# Patient Record
Sex: FEMALE | Race: WHITE | NOT HISPANIC OR LATINO | Employment: OTHER | URBAN - METROPOLITAN AREA
[De-identification: names, ages, dates, MRNs, and addresses within clinical notes are randomized per-mention and may not be internally consistent; named-entity substitution may affect disease eponyms.]

---

## 2017-09-26 ENCOUNTER — OFFICE VISIT (OUTPATIENT)
Dept: OBSTETRICS AND GYNECOLOGY | Facility: CLINIC | Age: 27
End: 2017-09-26

## 2017-09-26 VITALS
WEIGHT: 250 LBS | BODY MASS INDEX: 42.68 KG/M2 | SYSTOLIC BLOOD PRESSURE: 118 MMHG | DIASTOLIC BLOOD PRESSURE: 60 MMHG | HEIGHT: 64 IN

## 2017-09-26 DIAGNOSIS — N93.9 ABNORMAL UTERINE BLEEDING (AUB): Primary | ICD-10-CM

## 2017-09-26 DIAGNOSIS — R10.2 PELVIC PAIN IN FEMALE: ICD-10-CM

## 2017-09-26 PROCEDURE — 99203 OFFICE O/P NEW LOW 30 MIN: CPT | Performed by: OBSTETRICS & GYNECOLOGY

## 2017-09-26 RX ORDER — ALBUTEROL SULFATE 90 UG/1
2 AEROSOL, METERED RESPIRATORY (INHALATION)
COMMUNITY
Start: 2017-05-17

## 2017-09-26 RX ORDER — CLONAZEPAM 1 MG/1
1 TABLET ORAL
COMMUNITY

## 2017-09-26 RX ORDER — HYDROCODONE BITARTRATE AND ACETAMINOPHEN 10; 300 MG/1; MG/1
TABLET ORAL
COMMUNITY

## 2017-09-26 RX ORDER — DICYCLOMINE HYDROCHLORIDE 10 MG/1
10 CAPSULE ORAL
COMMUNITY
Start: 2017-05-31 | End: 2018-05-31

## 2017-09-26 RX ORDER — OXYCODONE AND ACETAMINOPHEN 7.5; 325 MG/1; MG/1
0.5 TABLET ORAL
COMMUNITY

## 2017-09-26 RX ORDER — ESCITALOPRAM OXALATE 20 MG/1
20 TABLET ORAL
COMMUNITY
Start: 2015-06-17

## 2017-09-26 RX ORDER — OMEPRAZOLE 40 MG/1
CAPSULE, DELAYED RELEASE ORAL
COMMUNITY
Start: 2017-09-13 | End: 2022-09-16

## 2017-09-26 RX ORDER — HYDROXYZINE HYDROCHLORIDE 25 MG/1
25 TABLET, FILM COATED ORAL
COMMUNITY

## 2017-09-26 RX ORDER — PROMETHAZINE HYDROCHLORIDE 25 MG/1
25 TABLET ORAL
COMMUNITY

## 2017-09-26 RX ORDER — TIZANIDINE HYDROCHLORIDE 4 MG/1
4 CAPSULE, GELATIN COATED ORAL
COMMUNITY

## 2017-09-26 RX ORDER — NORGESTIMATE AND ETHINYL ESTRADIOL 0.25-0.035
KIT ORAL
COMMUNITY

## 2017-09-26 RX ORDER — IPRATROPIUM BROMIDE AND ALBUTEROL SULFATE 2.5; .5 MG/3ML; MG/3ML
3 SOLUTION RESPIRATORY (INHALATION)
COMMUNITY
Start: 2017-05-17

## 2017-09-26 RX ORDER — GABAPENTIN 400 MG/1
1200 CAPSULE ORAL
COMMUNITY

## 2017-09-26 NOTE — PROGRESS NOTES
Subjective   Rosalie Elena is a 27 y.o. female is here today as a self referral for severe dysmenorrhea and heavy bleeding..    History of Present Illness-patient referred by Suzi Lockwood her nurse practitioner for a long history of abnormal.  Both bleeding and pelvic pain.  She had a laparotomy in the past which did not reveal any endometriosis.  She is being seen by Dr. Macy Nuno for interstitial cystitis and bladder ulcerations.  She has tried Mirena as well as multiple oral contraceptives without a resolution of her symptoms.  She and her mother both desirous of proceeding with a uterine ablation in hopes of helping both the bleeding and cramping with her menstrual cycles.  This was discussed in detail including the expectations, technique, recovery and risks.    The following portions of the patient's history were reviewed and updated as appropriate: allergies, current medications, past family history, past medical history, past social history, past surgical history and problem list.   Allergies   Allergen Reactions   • Aripiprazole    • Baclofen    • Cholestatin    • Eggs Or Egg-Derived Products    • Gluten Meal    • Ketorolac Tromethamine      MAKES ANXIOUS   • Lanolin    • Methylprednisolone      MAKES MANIC   • Moxifloxacin    • Neomycin    • Other      ANTI-PSYCHOTICS   • Pentosan Polysulfate Sodium    • Quetiapine    • Uretron D-S      Past Medical History:   Diagnosis Date   • Anxiety    • Asthma    • Bipolar disorder    • Colitis    • Fibromyalgia    • Hypermobile joints    • IBS (irritable bowel syndrome)    • Migraine    • PMDD (premenstrual dysphoric disorder)    • PTSD (post-traumatic stress disorder)      Past Surgical History:   Procedure Laterality Date   • EXPLORATORY LAPAROTOMY     • WISDOM TOOTH EXTRACTION       No current outpatient prescriptions on file prior to visit.     No current facility-administered medications on file prior to visit.      History reviewed. No pertinent  family history.      Review of Systems   Constitutional: Negative.    HENT: Negative.    Eyes: Negative.    Respiratory: Negative.    Cardiovascular: Negative.    Gastrointestinal: Negative.    Endocrine: Negative.    Genitourinary: Positive for menstrual problem, pelvic pain and vaginal bleeding.   Musculoskeletal: Negative.    Skin: Negative.    Allergic/Immunologic: Negative.    Neurological: Negative.    Hematological: Negative.    Psychiatric/Behavioral: Negative.        Objective   Physical Exam   Constitutional: She is oriented to person, place, and time. She appears well-developed and well-nourished.   HENT:   Head: Normocephalic and atraumatic.   Eyes: Pupils are equal, round, and reactive to light.   Pulmonary/Chest: Effort normal.   Neurological: She is alert and oriented to person, place, and time. She has normal reflexes.   Psychiatric: She has a normal mood and affect. Her behavior is normal. Judgment and thought content normal.   Nursing note and vitals reviewed.        Assessment/Plan   Problems Addressed this Visit     None      Visit Diagnoses     Abnormal uterine bleeding (AUB)    -  Primary    Relevant Orders    Case Request    Pelvic pain in female        Relevant Orders    Case Request      After long discussion of potential benefits and risks with both patient and her mother she would like to proceed with a uterine ablation.  She is aware of the scarring of the uterus that will result and the recommendation of not trying to get pregnant after the procedure.  She is also aware because of her young age she is in a risk category of the likely having a recurrence of symptoms later on down the road.

## 2018-09-12 ENCOUNTER — OFFICE (OUTPATIENT)
Dept: URBAN - METROPOLITAN AREA CLINIC 75 | Facility: CLINIC | Age: 28
End: 2018-09-12
Payer: MEDICAID

## 2018-09-12 VITALS
SYSTOLIC BLOOD PRESSURE: 122 MMHG | HEIGHT: 64 IN | WEIGHT: 232 LBS | DIASTOLIC BLOOD PRESSURE: 66 MMHG | HEART RATE: 88 BPM

## 2018-09-12 DIAGNOSIS — R19.7 DIARRHEA, UNSPECIFIED: ICD-10-CM

## 2018-09-12 DIAGNOSIS — K59.00 CONSTIPATION, UNSPECIFIED: ICD-10-CM

## 2018-09-12 DIAGNOSIS — K21.9 GASTRO-ESOPHAGEAL REFLUX DISEASE WITHOUT ESOPHAGITIS: ICD-10-CM

## 2018-09-12 DIAGNOSIS — K62.5 HEMORRHAGE OF ANUS AND RECTUM: ICD-10-CM

## 2018-09-12 DIAGNOSIS — Z83.71 FAMILY HISTORY OF COLONIC POLYPS: ICD-10-CM

## 2018-09-12 DIAGNOSIS — R11.0 NAUSEA: ICD-10-CM

## 2018-09-12 PROCEDURE — 99204 OFFICE O/P NEW MOD 45 MIN: CPT | Performed by: INTERNAL MEDICINE

## 2018-09-12 RX ORDER — DEXLANSOPRAZOLE 60 MG/1
CAPSULE, DELAYED RELEASE ORAL
Qty: 30 | Refills: 11 | Status: COMPLETED
Start: 2018-09-12 | End: 2019-02-07

## 2019-02-07 VITALS
HEART RATE: 60 BPM | DIASTOLIC BLOOD PRESSURE: 65 MMHG | OXYGEN SATURATION: 98 % | SYSTOLIC BLOOD PRESSURE: 111 MMHG | SYSTOLIC BLOOD PRESSURE: 129 MMHG | RESPIRATION RATE: 12 BRPM | OXYGEN SATURATION: 96 % | SYSTOLIC BLOOD PRESSURE: 93 MMHG | HEART RATE: 67 BPM | TEMPERATURE: 97.8 F | HEART RATE: 70 BPM | RESPIRATION RATE: 18 BRPM | DIASTOLIC BLOOD PRESSURE: 46 MMHG | OXYGEN SATURATION: 95 % | DIASTOLIC BLOOD PRESSURE: 56 MMHG | DIASTOLIC BLOOD PRESSURE: 42 MMHG | OXYGEN SATURATION: 97 % | RESPIRATION RATE: 16 BRPM | WEIGHT: 190 LBS | SYSTOLIC BLOOD PRESSURE: 112 MMHG | HEART RATE: 63 BPM | SYSTOLIC BLOOD PRESSURE: 99 MMHG | OXYGEN SATURATION: 99 % | DIASTOLIC BLOOD PRESSURE: 58 MMHG | RESPIRATION RATE: 19 BRPM | SYSTOLIC BLOOD PRESSURE: 90 MMHG | HEART RATE: 69 BPM | HEART RATE: 68 BPM | HEART RATE: 65 BPM | RESPIRATION RATE: 17 BRPM | HEIGHT: 64 IN | DIASTOLIC BLOOD PRESSURE: 77 MMHG | DIASTOLIC BLOOD PRESSURE: 69 MMHG | SYSTOLIC BLOOD PRESSURE: 98 MMHG | DIASTOLIC BLOOD PRESSURE: 57 MMHG | HEART RATE: 62 BPM | RESPIRATION RATE: 14 BRPM | TEMPERATURE: 99.2 F

## 2019-02-08 ENCOUNTER — AMBULATORY SURGICAL CENTER (OUTPATIENT)
Dept: URBAN - METROPOLITAN AREA SURGERY 17 | Facility: SURGERY | Age: 29
End: 2019-02-08
Payer: MEDICAID

## 2019-02-08 ENCOUNTER — OFFICE (OUTPATIENT)
Dept: URBAN - METROPOLITAN AREA PATHOLOGY 4 | Facility: PATHOLOGY | Age: 29
End: 2019-02-08
Payer: MEDICAID

## 2019-02-08 DIAGNOSIS — R19.7 DIARRHEA, UNSPECIFIED: ICD-10-CM

## 2019-02-08 DIAGNOSIS — R11.0 NAUSEA: ICD-10-CM

## 2019-02-08 DIAGNOSIS — K62.5 HEMORRHAGE OF ANUS AND RECTUM: ICD-10-CM

## 2019-02-08 DIAGNOSIS — K29.70 GASTRITIS, UNSPECIFIED, WITHOUT BLEEDING: ICD-10-CM

## 2019-02-08 DIAGNOSIS — K31.89 OTHER DISEASES OF STOMACH AND DUODENUM: ICD-10-CM

## 2019-02-08 DIAGNOSIS — Z83.71 FAMILY HISTORY OF COLONIC POLYPS: ICD-10-CM

## 2019-02-08 DIAGNOSIS — K21.9 GASTRO-ESOPHAGEAL REFLUX DISEASE WITHOUT ESOPHAGITIS: ICD-10-CM

## 2019-02-08 LAB
GI HISTOLOGY: A. UNSPECIFIED: (no result)
GI HISTOLOGY: B. SELECT: (no result)
GI HISTOLOGY: C. UNSPECIFIED: (no result)
GI HISTOLOGY: D. UNSPECIFIED: (no result)
GI HISTOLOGY: PDF REPORT: (no result)

## 2019-02-08 PROCEDURE — 45380 COLONOSCOPY AND BIOPSY: CPT | Performed by: INTERNAL MEDICINE

## 2019-02-08 PROCEDURE — 88305 TISSUE EXAM BY PATHOLOGIST: CPT | Performed by: INTERNAL MEDICINE

## 2019-02-08 PROCEDURE — 43239 EGD BIOPSY SINGLE/MULTIPLE: CPT | Performed by: INTERNAL MEDICINE

## 2024-06-14 ENCOUNTER — TELEPHONE (OUTPATIENT)
Dept: GASTROENTEROLOGY | Facility: CLINIC | Age: 34
End: 2024-06-14
Payer: MEDICARE

## 2024-06-14 NOTE — TELEPHONE ENCOUNTER
Left voice message for patient to return call in reference to a referral we received from Caryn Pedersen for Gastro-esophageal reflux disease without esophagitis. Patient needs a DA phone screening appointment. Patient can be schedule next available with any office. She isn't established with any Gastro office. Will defer for two days to give patient time to return call.

## 2024-06-17 NOTE — TELEPHONE ENCOUNTER
Patient's mother Lakesha, called to make an appointment for her daughter. I advised that the office received a notice from PCP about GERD. Mother stated that her daughter has GI issues. Bloating, food allergies, GERD, rectum gets tight to the point that patient has to open it herself.  So with these other details could not scheduled patient for a DA call had to scheduled for a new patient appointment.  Mom wanted to make sure that the providers were female advised that the provider and APRN's were female. Scheduled for first appointment with the latest arrival time. Reminder has been mailed to this patient.

## 2024-07-26 ENCOUNTER — HOSPITAL ENCOUNTER (EMERGENCY)
Facility: HOSPITAL | Age: 34
Discharge: PSYCHIATRIC OR SUBSTANCE USE FACILITY | DRG: 885 | End: 2024-07-27
Attending: EMERGENCY MEDICINE
Payer: MEDICARE

## 2024-07-26 DIAGNOSIS — R45.851 SUICIDAL IDEATION: Primary | ICD-10-CM

## 2024-07-26 PROCEDURE — 99285 EMERGENCY DEPT VISIT HI MDM: CPT

## 2024-07-26 PROCEDURE — 36415 COLL VENOUS BLD VENIPUNCTURE: CPT

## 2024-07-27 ENCOUNTER — HOSPITAL ENCOUNTER (INPATIENT)
Facility: HOSPITAL | Age: 34
LOS: 2 days | Discharge: HOME | End: 2024-07-29
Attending: PSYCHIATRY & NEUROLOGY | Admitting: PSYCHIATRY & NEUROLOGY
Payer: MEDICARE

## 2024-07-27 VITALS
OXYGEN SATURATION: 100 % | HEIGHT: 64 IN | SYSTOLIC BLOOD PRESSURE: 104 MMHG | RESPIRATION RATE: 18 BRPM | TEMPERATURE: 98.3 F | BODY MASS INDEX: 22.81 KG/M2 | WEIGHT: 133.6 LBS | DIASTOLIC BLOOD PRESSURE: 74 MMHG | HEART RATE: 68 BPM

## 2024-07-27 PROBLEM — F32.A DEPRESSION, UNSPECIFIED: Status: ACTIVE | Noted: 2024-07-27

## 2024-07-27 LAB
ALBUMIN SERPL-MCNC: 4.2 G/DL (ref 3.5–5.2)
ALBUMIN/GLOB SERPL: 2 G/DL
ALP SERPL-CCNC: 60 U/L (ref 39–117)
ALT SERPL W P-5'-P-CCNC: 15 U/L (ref 1–33)
AMPHET+METHAMPHET UR QL: NEGATIVE
ANION GAP SERPL CALCULATED.3IONS-SCNC: 11.9 MMOL/L (ref 5–15)
APAP SERPL-MCNC: <5 MCG/ML (ref 0–30)
AST SERPL-CCNC: 19 U/L (ref 1–32)
BARBITURATES UR QL SCN: NEGATIVE
BASOPHILS # BLD AUTO: 0.04 10*3/MM3 (ref 0–0.2)
BASOPHILS NFR BLD AUTO: 0.5 % (ref 0–1.5)
BENZODIAZ UR QL SCN: POSITIVE
BILIRUB SERPL-MCNC: 0.3 MG/DL (ref 0–1.2)
BUN SERPL-MCNC: 11 MG/DL (ref 6–20)
BUN/CREAT SERPL: 13.6 (ref 7–25)
CALCIUM SPEC-SCNC: 9.2 MG/DL (ref 8.6–10.5)
CANNABINOIDS SERPL QL: POSITIVE
CHLORIDE SERPL-SCNC: 105 MMOL/L (ref 98–107)
CO2 SERPL-SCNC: 24.1 MMOL/L (ref 22–29)
COCAINE UR QL: NEGATIVE
CREAT SERPL-MCNC: 0.81 MG/DL (ref 0.57–1)
DEPRECATED RDW RBC AUTO: 42.7 FL (ref 37–54)
EGFRCR SERPLBLD CKD-EPI 2021: 98.4 ML/MIN/1.73
EOSINOPHIL # BLD AUTO: 0.37 10*3/MM3 (ref 0–0.4)
EOSINOPHIL NFR BLD AUTO: 4.2 % (ref 0.3–6.2)
ERYTHROCYTE [DISTWIDTH] IN BLOOD BY AUTOMATED COUNT: 12.7 % (ref 12.3–15.4)
ETHANOL BLD-MCNC: <10 MG/DL (ref 0–10)
ETHANOL UR QL: <0.01 %
FENTANYL UR-MCNC: POSITIVE NG/ML
GLOBULIN UR ELPH-MCNC: 2.1 GM/DL
GLUCOSE SERPL-MCNC: 97 MG/DL (ref 65–99)
HCG INTACT+B SERPL-ACNC: <0.5 MIU/ML
HCT VFR BLD AUTO: 36.1 % (ref 34–46.6)
HGB BLD-MCNC: 12.3 G/DL (ref 12–15.9)
HOLD SPECIMEN: NORMAL
HOLD SPECIMEN: NORMAL
IMM GRANULOCYTES # BLD AUTO: 0.02 10*3/MM3 (ref 0–0.05)
IMM GRANULOCYTES NFR BLD AUTO: 0.2 % (ref 0–0.5)
LYMPHOCYTES # BLD AUTO: 2.34 10*3/MM3 (ref 0.7–3.1)
LYMPHOCYTES NFR BLD AUTO: 26.5 % (ref 19.6–45.3)
MCH RBC QN AUTO: 31.5 PG (ref 26.6–33)
MCHC RBC AUTO-ENTMCNC: 34.1 G/DL (ref 31.5–35.7)
MCV RBC AUTO: 92.3 FL (ref 79–97)
METHADONE UR QL SCN: NEGATIVE
MONOCYTES # BLD AUTO: 0.48 10*3/MM3 (ref 0.1–0.9)
MONOCYTES NFR BLD AUTO: 5.4 % (ref 5–12)
NEUTROPHILS NFR BLD AUTO: 5.58 10*3/MM3 (ref 1.7–7)
NEUTROPHILS NFR BLD AUTO: 63.2 % (ref 42.7–76)
NRBC BLD AUTO-RTO: 0 /100 WBC (ref 0–0.2)
OPIATES UR QL: NEGATIVE
OXYCODONE UR QL SCN: NEGATIVE
PLATELET # BLD AUTO: 185 10*3/MM3 (ref 140–450)
PMV BLD AUTO: 11.8 FL (ref 6–12)
POTASSIUM SERPL-SCNC: 3.9 MMOL/L (ref 3.5–5.2)
PROT SERPL-MCNC: 6.3 G/DL (ref 6–8.5)
QT INTERVAL: 409 MS
QTC INTERVAL: 425 MS
RBC # BLD AUTO: 3.91 10*6/MM3 (ref 3.77–5.28)
SALICYLATES SERPL-MCNC: <0.3 MG/DL
SODIUM SERPL-SCNC: 141 MMOL/L (ref 136–145)
T4 FREE SERPL-MCNC: 1.12 NG/DL (ref 0.92–1.68)
TSH SERPL DL<=0.05 MIU/L-ACNC: 1.33 UIU/ML (ref 0.27–4.2)
WBC NRBC COR # BLD AUTO: 8.83 10*3/MM3 (ref 3.4–10.8)
WHOLE BLOOD HOLD COAG: NORMAL
WHOLE BLOOD HOLD SPECIMEN: NORMAL

## 2024-07-27 PROCEDURE — 93005 ELECTROCARDIOGRAM TRACING: CPT | Performed by: EMERGENCY MEDICINE

## 2024-07-27 PROCEDURE — 80307 DRUG TEST PRSMV CHEM ANLYZR: CPT | Performed by: EMERGENCY MEDICINE

## 2024-07-27 PROCEDURE — 84702 CHORIONIC GONADOTROPIN TEST: CPT | Performed by: EMERGENCY MEDICINE

## 2024-07-27 PROCEDURE — 84439 ASSAY OF FREE THYROXINE: CPT | Performed by: EMERGENCY MEDICINE

## 2024-07-27 PROCEDURE — 36415 COLL VENOUS BLD VENIPUNCTURE: CPT

## 2024-07-27 PROCEDURE — 82077 ASSAY SPEC XCP UR&BREATH IA: CPT | Performed by: EMERGENCY MEDICINE

## 2024-07-27 PROCEDURE — 63710000001 ONDANSETRON ODT 4 MG TABLET DISPERSIBLE: Performed by: PSYCHIATRY & NEUROLOGY

## 2024-07-27 PROCEDURE — 80143 DRUG ASSAY ACETAMINOPHEN: CPT | Performed by: EMERGENCY MEDICINE

## 2024-07-27 PROCEDURE — 80053 COMPREHEN METABOLIC PANEL: CPT | Performed by: EMERGENCY MEDICINE

## 2024-07-27 PROCEDURE — 84443 ASSAY THYROID STIM HORMONE: CPT | Performed by: EMERGENCY MEDICINE

## 2024-07-27 PROCEDURE — 85025 COMPLETE CBC W/AUTO DIFF WBC: CPT | Performed by: EMERGENCY MEDICINE

## 2024-07-27 PROCEDURE — 80179 DRUG ASSAY SALICYLATE: CPT | Performed by: EMERGENCY MEDICINE

## 2024-07-27 RX ORDER — ALUMINA, MAGNESIA, AND SIMETHICONE 2400; 2400; 240 MG/30ML; MG/30ML; MG/30ML
15 SUSPENSION ORAL EVERY 6 HOURS PRN
Status: DISCONTINUED | OUTPATIENT
Start: 2024-07-27 | End: 2024-07-29 | Stop reason: HOSPADM

## 2024-07-27 RX ORDER — ACETAMINOPHEN 500 MG
1000 TABLET ORAL ONCE
Status: COMPLETED | OUTPATIENT
Start: 2024-07-27 | End: 2024-07-27

## 2024-07-27 RX ORDER — DIPHENHYDRAMINE HCL 50 MG
50 CAPSULE ORAL EVERY 4 HOURS PRN
Status: DISCONTINUED | OUTPATIENT
Start: 2024-07-27 | End: 2024-07-29 | Stop reason: HOSPADM

## 2024-07-27 RX ORDER — ACETAMINOPHEN 325 MG/1
650 TABLET ORAL EVERY 6 HOURS PRN
Status: DISCONTINUED | OUTPATIENT
Start: 2024-07-27 | End: 2024-07-29 | Stop reason: HOSPADM

## 2024-07-27 RX ORDER — LORAZEPAM 2 MG/ML
2 INJECTION INTRAMUSCULAR
Status: DISCONTINUED | OUTPATIENT
Start: 2024-07-27 | End: 2024-07-27

## 2024-07-27 RX ORDER — FOLIC ACID 1 MG/1
1 TABLET ORAL DAILY
Status: DISCONTINUED | OUTPATIENT
Start: 2024-07-27 | End: 2024-07-29 | Stop reason: HOSPADM

## 2024-07-27 RX ORDER — GABAPENTIN 600 MG/1
600 TABLET ORAL EVERY MORNING
COMMUNITY

## 2024-07-27 RX ORDER — GABAPENTIN 600 MG/1
1200 TABLET ORAL EVERY EVENING
COMMUNITY

## 2024-07-27 RX ORDER — TRAZODONE HYDROCHLORIDE 100 MG/1
100 TABLET ORAL NIGHTLY PRN
Status: DISCONTINUED | OUTPATIENT
Start: 2024-07-27 | End: 2024-07-29 | Stop reason: HOSPADM

## 2024-07-27 RX ORDER — ESCITALOPRAM OXALATE 10 MG/1
20 TABLET ORAL DAILY
Status: DISCONTINUED | OUTPATIENT
Start: 2024-07-27 | End: 2024-07-29 | Stop reason: HOSPADM

## 2024-07-27 RX ORDER — HYDROXYZINE HYDROCHLORIDE 25 MG/1
50 TABLET, FILM COATED ORAL EVERY 6 HOURS PRN
Status: DISCONTINUED | OUTPATIENT
Start: 2024-07-27 | End: 2024-07-29 | Stop reason: HOSPADM

## 2024-07-27 RX ORDER — FAMOTIDINE 20 MG/1
20 TABLET, FILM COATED ORAL DAILY
Status: DISCONTINUED | OUTPATIENT
Start: 2024-07-27 | End: 2024-07-29

## 2024-07-27 RX ORDER — TIZANIDINE 4 MG/1
4 TABLET ORAL ONCE
Status: COMPLETED | OUTPATIENT
Start: 2024-07-27 | End: 2024-07-27

## 2024-07-27 RX ORDER — DIPHENHYDRAMINE HYDROCHLORIDE 50 MG/ML
50 INJECTION INTRAMUSCULAR; INTRAVENOUS EVERY 4 HOURS PRN
Status: DISCONTINUED | OUTPATIENT
Start: 2024-07-27 | End: 2024-07-29 | Stop reason: HOSPADM

## 2024-07-27 RX ORDER — ONDANSETRON 8 MG/1
8 TABLET, ORALLY DISINTEGRATING ORAL EVERY 8 HOURS PRN
COMMUNITY

## 2024-07-27 RX ORDER — GABAPENTIN 400 MG/1
1200 CAPSULE ORAL NIGHTLY
Status: DISCONTINUED | OUTPATIENT
Start: 2024-07-27 | End: 2024-07-29 | Stop reason: HOSPADM

## 2024-07-27 RX ORDER — ONDANSETRON 4 MG/1
4 TABLET, ORALLY DISINTEGRATING ORAL EVERY 6 HOURS PRN
Status: DISCONTINUED | OUTPATIENT
Start: 2024-07-27 | End: 2024-07-29 | Stop reason: HOSPADM

## 2024-07-27 RX ORDER — CLONAZEPAM 0.5 MG/1
1 TABLET ORAL 2 TIMES DAILY
Status: DISCONTINUED | OUTPATIENT
Start: 2024-07-27 | End: 2024-07-29 | Stop reason: HOSPADM

## 2024-07-27 RX ORDER — LORAZEPAM 2 MG/ML
1 INJECTION INTRAMUSCULAR
Status: DISCONTINUED | OUTPATIENT
Start: 2024-07-27 | End: 2024-07-27

## 2024-07-27 RX ORDER — LOPERAMIDE HYDROCHLORIDE 2 MG/1
2 CAPSULE ORAL
Status: DISCONTINUED | OUTPATIENT
Start: 2024-07-27 | End: 2024-07-29 | Stop reason: HOSPADM

## 2024-07-27 RX ORDER — CLONAZEPAM 1 MG/1
2 TABLET ORAL NIGHTLY PRN
COMMUNITY

## 2024-07-27 RX ORDER — LORAZEPAM 2 MG/1
2 TABLET ORAL
Status: DISCONTINUED | OUTPATIENT
Start: 2024-07-27 | End: 2024-07-27

## 2024-07-27 RX ORDER — NICOTINE 21 MG/24HR
1 PATCH, TRANSDERMAL 24 HOURS TRANSDERMAL DAILY PRN
Status: DISCONTINUED | OUTPATIENT
Start: 2024-07-27 | End: 2024-07-29 | Stop reason: HOSPADM

## 2024-07-27 RX ORDER — ALBUTEROL SULFATE 90 UG/1
2 AEROSOL, METERED RESPIRATORY (INHALATION) EVERY 4 HOURS PRN
Status: DISCONTINUED | OUTPATIENT
Start: 2024-07-27 | End: 2024-07-29 | Stop reason: HOSPADM

## 2024-07-27 RX ORDER — LORAZEPAM 0.5 MG/1
1 TABLET ORAL
Status: DISCONTINUED | OUTPATIENT
Start: 2024-07-27 | End: 2024-07-27

## 2024-07-27 RX ORDER — POLYETHYLENE GLYCOL 3350 17 G/17G
17 POWDER, FOR SOLUTION ORAL DAILY PRN
Status: DISCONTINUED | OUTPATIENT
Start: 2024-07-27 | End: 2024-07-29 | Stop reason: HOSPADM

## 2024-07-27 RX ORDER — HYOSCYAMINE SULFATE 0.38 MG/1
375 TABLET, EXTENDED RELEASE ORAL EVERY 12 HOURS
Status: DISCONTINUED | OUTPATIENT
Start: 2024-07-27 | End: 2024-07-29 | Stop reason: HOSPADM

## 2024-07-27 RX ORDER — GABAPENTIN 300 MG/1
600 CAPSULE ORAL EVERY MORNING
Status: DISCONTINUED | OUTPATIENT
Start: 2024-07-27 | End: 2024-07-28

## 2024-07-27 RX ADMIN — ONDANSETRON 4 MG: 4 TABLET, ORALLY DISINTEGRATING ORAL at 18:12

## 2024-07-27 RX ADMIN — FOLIC ACID 1 MG: 1 TABLET ORAL at 10:01

## 2024-07-27 RX ADMIN — TIZANIDINE 4 MG: 4 TABLET ORAL at 21:07

## 2024-07-27 RX ADMIN — ACETAMINOPHEN 1000 MG: 500 TABLET ORAL at 03:22

## 2024-07-27 RX ADMIN — CLONAZEPAM 1 MG: 0.5 TABLET ORAL at 21:06

## 2024-07-27 RX ADMIN — GABAPENTIN 1200 MG: 400 CAPSULE ORAL at 21:07

## 2024-07-27 RX ADMIN — ESCITALOPRAM OXALATE 20 MG: 10 TABLET ORAL at 10:01

## 2024-07-27 RX ADMIN — FAMOTIDINE 20 MG: 20 TABLET ORAL at 10:01

## 2024-07-27 RX ADMIN — GABAPENTIN 600 MG: 300 CAPSULE ORAL at 11:01

## 2024-07-27 RX ADMIN — HYOSCYAMINE SULFATE 375 MCG: 0.38 TABLET, EXTENDED RELEASE ORAL at 11:01

## 2024-07-27 RX ADMIN — HYOSCYAMINE SULFATE 375 MCG: 0.38 TABLET, EXTENDED RELEASE ORAL at 21:07

## 2024-07-27 RX ADMIN — CLONAZEPAM 1 MG: 0.5 TABLET ORAL at 10:01

## 2024-07-27 NOTE — PLAN OF CARE
Goal Outcome Evaluation:  Plan of Care Reviewed With: patient  Patient Agreement with Plan of Care: agrees      Pt. Has been cooperative,  reports feeling tired and waiting for the doctor to restart her medications and for dietary to get her diet right. Pt. Reports food allergy to gluten,soy,dairy and rice but denied any to eggs. Pt. Also stated she can eat meat. Pt. Is denying any si/hi/avh and making her needs known. Pt. This afternoon attended group and her mood is better this afternoon. Pt. Remains on CW 1:1 for safety, will con't to monitor and provide a safe environment. Reviewed with charge nurse.
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
## 2024-07-27 NOTE — ED NOTES
Pt Sitting up in bed eating popsicle and drinking water. Briana well at this time. Pt also ate and briana banana brought by mother. Pt offered multiple other choices with declination, stating that she has gluten and dairy allergies and also cannot drink juices and soda due to hx of cystitis.
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
## 2024-07-27 NOTE — ED NOTES
Report given to Jerica MURRAY. Pt instructed on room assignment, need to sign consent form, and that she could not have cell phone with her. Pt gave cell phone to mother.
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
## 2024-07-28 RX ORDER — POLYETHYLENE GLYCOL 3350 17 G/17G
17 POWDER, FOR SOLUTION ORAL ONCE
Status: COMPLETED | OUTPATIENT
Start: 2024-07-28 | End: 2024-07-28

## 2024-07-28 RX ORDER — TIZANIDINE 4 MG/1
2 TABLET ORAL EVERY 12 HOURS PRN
Status: DISCONTINUED | OUTPATIENT
Start: 2024-07-28 | End: 2024-07-29 | Stop reason: HOSPADM

## 2024-07-28 RX ORDER — TIZANIDINE 4 MG/1
4 TABLET ORAL EVERY 12 HOURS PRN
Status: DISCONTINUED | OUTPATIENT
Start: 2024-07-28 | End: 2024-07-28

## 2024-07-28 RX ORDER — GABAPENTIN 300 MG/1
600 CAPSULE ORAL DAILY
Status: DISCONTINUED | OUTPATIENT
Start: 2024-07-28 | End: 2024-07-29 | Stop reason: HOSPADM

## 2024-07-28 RX ADMIN — GABAPENTIN 600 MG: 300 CAPSULE ORAL at 09:13

## 2024-07-28 RX ADMIN — FAMOTIDINE 20 MG: 20 TABLET ORAL at 09:14

## 2024-07-28 RX ADMIN — ALUMINUM HYDROXIDE, MAGNESIUM HYDROXIDE, AND DIMETHICONE 15 ML: 400; 400; 40 SUSPENSION ORAL at 23:05

## 2024-07-28 RX ADMIN — CLONAZEPAM 1 MG: 0.5 TABLET ORAL at 09:14

## 2024-07-28 RX ADMIN — ESCITALOPRAM OXALATE 20 MG: 10 TABLET ORAL at 09:13

## 2024-07-28 RX ADMIN — GABAPENTIN 1200 MG: 400 CAPSULE ORAL at 20:37

## 2024-07-28 RX ADMIN — HYOSCYAMINE SULFATE 375 MCG: 0.38 TABLET, EXTENDED RELEASE ORAL at 09:13

## 2024-07-28 RX ADMIN — HYOSCYAMINE SULFATE 375 MCG: 0.38 TABLET, EXTENDED RELEASE ORAL at 20:37

## 2024-07-28 RX ADMIN — CLONAZEPAM 1 MG: 0.5 TABLET ORAL at 20:37

## 2024-07-28 RX ADMIN — FOLIC ACID 1 MG: 1 TABLET ORAL at 09:13

## 2024-07-28 RX ADMIN — TRAZODONE HYDROCHLORIDE 100 MG: 100 TABLET ORAL at 22:52

## 2024-07-28 RX ADMIN — HYDROXYZINE HYDROCHLORIDE 50 MG: 25 TABLET ORAL at 00:03

## 2024-07-28 RX ADMIN — TIZANIDINE 2 MG: 4 TABLET ORAL at 22:48

## 2024-07-28 RX ADMIN — POLYETHYLENE GLYCOL 3350 17 G: 17 POWDER, FOR SOLUTION ORAL at 13:11

## 2024-07-28 NOTE — PLAN OF CARE
Goal Outcome Evaluation:    The patient is alert, oriented and withdrawn to her room. The patient denies current SI, HI or AVH. The patient rates her anxiety 6/10 and depression 8.5/10. The patient was cooperative with assessment and medication administration. The patient continues to be a closewatch 1:1. The patient was given [place] jelly packet as requested to help her use the restroom and atarax to help ease her anxiety to help her sleep. The patient had a snack of turkey, banana and water for nourishment and was observed coloring to help with coping. Care of this patient is ongoing. -- AS RN

## 2024-07-28 NOTE — PLAN OF CARE
Goal Outcome Evaluation:  Plan of Care Reviewed With: patient  Patient Agreement with Plan of Care: agrees        Pt. Slept in some this morning feeling tired due to having trouble sleeping last night. Pt. Denied any si/hi/avh, reports a good visit with mother and is hoping to be released on Monday. Will con't to monitor and provide a safe environment. Reviewed with charge nurse.
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
## 2024-07-29 VITALS
WEIGHT: 133.6 LBS | RESPIRATION RATE: 16 BRPM | DIASTOLIC BLOOD PRESSURE: 88 MMHG | OXYGEN SATURATION: 100 % | TEMPERATURE: 97.3 F | BODY MASS INDEX: 22.81 KG/M2 | SYSTOLIC BLOOD PRESSURE: 118 MMHG | HEIGHT: 64 IN | HEART RATE: 105 BPM

## 2024-07-29 PROBLEM — K58.1 IRRITABLE BOWEL SYNDROME WITH CONSTIPATION: Status: ACTIVE | Noted: 2017-05-17

## 2024-07-29 PROBLEM — N30.10 INTERSTITIAL CYSTITIS: Status: ACTIVE | Noted: 2022-09-22

## 2024-07-29 PROBLEM — F33.1 MAJOR DEPRESSIVE DISORDER, RECURRENT EPISODE, MODERATE: Status: ACTIVE | Noted: 2024-07-29

## 2024-07-29 PROBLEM — F43.10 PTSD (POST-TRAUMATIC STRESS DISORDER): Status: ACTIVE | Noted: 2024-07-29

## 2024-07-29 PROBLEM — K21.9 GASTROESOPHAGEAL REFLUX DISEASE WITHOUT ESOPHAGITIS: Status: ACTIVE | Noted: 2017-05-17

## 2024-07-29 PROBLEM — M79.7 FIBROMYALGIA: Status: ACTIVE | Noted: 2024-07-29

## 2024-07-29 PROBLEM — F32.81 PREMENSTRUAL DYSPHORIC DISORDER: Status: ACTIVE | Noted: 2022-09-22

## 2024-07-29 PROCEDURE — 94799 UNLISTED PULMONARY SVC/PX: CPT

## 2024-07-29 PROCEDURE — 94664 DEMO&/EVAL PT USE INHALER: CPT

## 2024-07-29 RX ORDER — PANTOPRAZOLE SODIUM 40 MG/1
40 TABLET, DELAYED RELEASE ORAL DAILY
Status: DISCONTINUED | OUTPATIENT
Start: 2024-07-29 | End: 2024-07-29 | Stop reason: HOSPADM

## 2024-07-29 RX ORDER — ALBUTEROL SULFATE 90 UG/1
2 AEROSOL, METERED RESPIRATORY (INHALATION) EVERY 4 HOURS PRN
Start: 2024-07-29

## 2024-07-29 RX ADMIN — FOLIC ACID 1 MG: 1 TABLET ORAL at 09:43

## 2024-07-29 RX ADMIN — ESCITALOPRAM OXALATE 20 MG: 10 TABLET ORAL at 09:44

## 2024-07-29 RX ADMIN — GABAPENTIN 600 MG: 300 CAPSULE ORAL at 09:44

## 2024-07-29 RX ADMIN — CLONAZEPAM 1 MG: 0.5 TABLET ORAL at 09:43

## 2024-07-29 RX ADMIN — PANTOPRAZOLE SODIUM 40 MG: 40 TABLET, DELAYED RELEASE ORAL at 09:44

## 2024-07-29 RX ADMIN — HYOSCYAMINE SULFATE 375 MCG: 0.38 TABLET, EXTENDED RELEASE ORAL at 09:44

## 2024-07-29 NOTE — PLAN OF CARE
Goal Outcome Evaluation:  Plan of Care Reviewed With: patient  Patient Agreement with Plan of Care: agrees   Mood is stable. Restless while in conversation, eye contact appropriate. Rambles in conversation. Withdrawn to self until approached. Pt rated anxiety 6; denied depression, AVH, SI, HI. Pt med compliant. Slept throughout the night.

## 2024-07-29 NOTE — PLAN OF CARE
"Goal Outcome Evaluation:                                    Patient has met all goals and will be discharging home today. Safety plan completed, discharge teaching completed and patient receptive. All belongings will be returned upon leaving unit. Patient denies depression, SI, HI, and A/VH. Patient said she has \"some\" anxiety but only due to discharging home and it being a change. Safe environment provided.     "

## 2024-07-29 NOTE — SIGNIFICANT NOTE
07/29/24 1217   Plan   Patient/Family in Agreement with Plan yes   Final Discharge Disposition Code 01 - home or self-care   Final Note Patient is discharging home. She reports she has appointments set up with her medication provider and an evening appointment with her Therapist on Monday and Friday. She reports she is scheduled to meet via video with her Therapist later today. Pt arranged transportation with family, and her mother will provide transportation home.

## 2024-07-29 NOTE — DISCHARGE SUMMARY
[facility]         DISCHARGE SUMMARY    Patient Name: Rosalie Elena  : 1990  MRN: [id number]    Date of Admission: 2024  Date of Discharge: 2024  Primary Care Physician: Caryn Pedersen APRN    Consults       No orders found from 2024 to 2024.            Presenting Problem:   Depression, unspecified [F32.A]    Active and Resolved Hospital Problems:  Active Hospital Problems    Diagnosis POA    **Major depressive disorder, recurrent episode, moderate [F33.1] Yes    Fibromyalgia [M79.7] Yes    Interstitial cystitis [N30.10] Yes    Irritable bowel syndrome with constipation [K58.1] Yes    Gastroesophageal reflux disease without esophagitis [K21.9] Yes      Resolved Hospital Problems   No resolved problems to display.         Hospital Course     Hospital Course:  Rosalie Elena is a 34 y.o. female admitted on a voluntary basis for exacerbation of depression and PTSD with suicidal ideations.    Patient was started back on her home medications.  She reports a long history of psychiatric treatment, and has an outpatient provider.  Patient reports that her PTSD was triggered which led to her acute symptoms.  She reports that being in the hospital was helped stabilize her mood.  She has been maintained on her previous home medication regimen.  She did not want to change the medications.  Patient has been calm and cooperative.  She reports that she is feeling much better.  She is denying suicidal or homicidal ideation.  Patient states that she is now stable and she is using previously learned coping strategies.  Patient has a history of seeing a therapist and engaging in DBT and wants to continue with her previous outpatient provider.    Patient is calm and cooperative.  She is denying suicidal ideation.  Reports that she feels much better and stable and better able to deal with stress.        On day of discharge patient is calm, cooperative, engaging, and has no acute  "agitation or restlessness.  Speech is normal rate and volume and language is appropriate.  Mood is described as \"good\" and affect is euthymic.  Thought processes are goal directed, linear, future oriented.  Thought content is negative for suicidal or homicidal ideation, no hallucinations.  Insight is appropriate, and judgment is intact with no behavioral disturbance.      DISCHARGE Follow Up Recommendations for labs and diagnostics: Follow-up primary care for routine health maintenance management of chronic medical conditions, follow-up with previous psychiatric provider    Day of Discharge     Vital Signs:  Temp:  [97.3 °F (36.3 °C)-97.7 °F (36.5 °C)] 97.3 °F (36.3 °C)  Heart Rate:  [] 105  Resp:  [16-18] 16  BP: (118-127)/(68-88) 118/88      Pertinent  and/or Most Recent Results     LAB RESULTS:      Lab 07/27/24  0026   WBC 8.83   HEMOGLOBIN 12.3   HEMATOCRIT 36.1   PLATELETS 185   NEUTROS ABS 5.58   IMMATURE GRANS (ABS) 0.02   LYMPHS ABS 2.34   MONOS ABS 0.48   EOS ABS 0.37   MCV 92.3         Lab 07/27/24  0026   SODIUM 141   POTASSIUM 3.9   CHLORIDE 105   CO2 24.1   ANION GAP 11.9   BUN 11   CREATININE 0.81   EGFR 98.4   GLUCOSE 97   CALCIUM 9.2   TSH 1.330         Lab 07/27/24  0026   TOTAL PROTEIN 6.3   ALBUMIN 4.2   GLOBULIN 2.1   ALT (SGPT) 15   AST (SGOT) 19   BILIRUBIN 0.3   ALK PHOS 60                                     Lab 07/27/24  0026   ETHANOL PCT <0.010   ETHANOL MGDL <10         Lab 07/27/24  0026   AMPH/METHAM SCREEN, URINE Negative   BENZODIAZEPINE SCREEN, URINE Positive*   COCAINE SCREEN, URINE Negative   OPIATES Negative   THC URINE SCREEN Positive*   METHADONE SCREEN, URINE Negative     Brief Urine Lab Results       None                                Imaging Results (Last 7 Days)       ** No results found for the last 168 hours. **             Labs Pending at Discharge:           Discharge Details        Discharge Medications        Changes to Medications        Instructions Start " Date   albuterol sulfate  (90 Base) MCG/ACT inhaler  Commonly known as: PROVENTIL HFA;VENTOLIN HFA;PROAIR HFA  What changed:   when to take this  reasons to take this   2 puffs, Inhalation, Every 4 Hours PRN             Continue These Medications        Instructions Start Date   clonazePAM 1 MG tablet  Commonly known as: KlonoPIN   1 mg, Oral, Every Morning      clonazePAM 1 MG tablet  Commonly known as: KlonoPIN   2 mg, Oral, Nightly PRN      escitalopram 20 MG tablet  Commonly known as: LEXAPRO   20 mg, Oral, Every Evening      gabapentin 600 MG tablet  Commonly known as: NEURONTIN   600 mg, Oral, Every Morning      gabapentin 600 MG tablet  Commonly known as: NEURONTIN   1,200 mg, Oral, Every Evening      hyoscyamine 0.375 MG 12 hr tablet  Commonly known as: LEVBID   Take 1 tablet by mouth Every 12 (Twelve) Hours As Needed for Cramping.      MiraLax 17 g packet  Generic drug: polyethylene glycol   17 g, Oral, Daily PRN      mometasone-formoterol 100-5 MCG/ACT inhaler  Commonly known as: DULERA 100   2 puffs, Inhalation, 2 Times Daily - RT      ondansetron ODT 8 MG disintegrating tablet  Commonly known as: ZOFRAN-ODT   8 mg, Translingual, Every 8 Hours PRN      pantoprazole 40 MG EC tablet  Commonly known as: PROTONIX   40 mg, Oral, Daily      TiZANidine 4 MG capsule  Commonly known as: ZANAFLEX   4 mg, Oral, 3 Times Daily PRN               Allergies   Allergen Reactions    Aripiprazole     Baclofen     Cholestatin     Egg-Derived Products Other (See Comments)     Patient denies egg allergy and reports she eats them everyday.    Gluten Meal     Ketorolac Tromethamine      MAKES ANXIOUS    Lactose Intolerance (Gi) GI Intolerance    Lanolin     Meth-Hyo-M Bl-Na Phos-Ph Sal     Methylprednisolone      MAKES MANIC    Moxifloxacin     Neomycin     Other      ANTI-PSYCHOTICS    Pentosan Polysulfate Sodium     Quetiapine     Soybean-Containing Drug Products GI Intolerance    Starch Rice [Starch] GI Intolerance          Discharge Disposition:  Home or Self Care    Diet:  Hospital:  Diet Order   Procedures    Diet: Gastrointestinal; Gluten-Sensitive; Fluid Consistency: Thin (IDDSI 0)         Discharge Activity: Ad stephanie.  Activity Instructions       Activity as Tolerated              Discharge Condition: Stable    CODE STATUS:  Code Status and Medical Interventions: CPR (Attempt to Resuscitate); Full Support   Ordered at: 07/27/24 0452     Code Status (Patient has no pulse and is not breathing):    CPR (Attempt to Resuscitate)     Medical Interventions (Patient has pulse or is breathing):    Full Support         Future Appointments   Date Time Provider Department Center   9/26/2024  2:00 PM Tootie Rao APRN [facility]       Additional Instructions for the Follow-ups that You Need to Schedule       Discharge Follow-up with PCP   As directed       Currently Documented PCP:    Caryn Pedersen APRN    PCP Phone Number:    [phone]     Follow Up Details: As needed        Discharge Follow-up with Specified Provider: Previous psychiatric provider   As directed      To: Previous psychiatric provider                Time spent on Discharge including face to face service: 30 minutes    Part of this note may be an electronic transcription/translation of spoken language to printed text using the Dragon dictation system.        Electronically signed by Seymour Holm MD, 07/29/24, 11:19 AM EDT.

## 2024-08-04 LAB
QT INTERVAL: 409 MS
QTC INTERVAL: 425 MS